# Patient Record
(demographics unavailable — no encounter records)

---

## 2025-01-31 NOTE — ASSESSMENT
[FreeTextEntry1] : Blood pressure taken by me was 140/80.  Patient will continue present medication no changes are made

## 2025-01-31 NOTE — REASON FOR VISIT
[Hyperlipidemia] : hyperlipidemia [Other: ____] : [unfilled] [FreeTextEntry1] : 63 years old female with hypothyroidism and dyslipidemia comes to the office for routine follow-up